# Patient Record
Sex: FEMALE | Race: WHITE | NOT HISPANIC OR LATINO | Employment: UNEMPLOYED | URBAN - METROPOLITAN AREA
[De-identification: names, ages, dates, MRNs, and addresses within clinical notes are randomized per-mention and may not be internally consistent; named-entity substitution may affect disease eponyms.]

---

## 2021-03-15 ENCOUNTER — HOSPITAL ENCOUNTER (EMERGENCY)
Facility: HOSPITAL | Age: 26
Discharge: HOME | End: 2021-03-15
Attending: EMERGENCY MEDICINE | Admitting: EMERGENCY MEDICINE
Payer: COMMERCIAL

## 2021-03-15 VITALS
HEART RATE: 89 BPM | TEMPERATURE: 97.2 F | SYSTOLIC BLOOD PRESSURE: 119 MMHG | WEIGHT: 100 LBS | BODY MASS INDEX: 21.57 KG/M2 | DIASTOLIC BLOOD PRESSURE: 61 MMHG | RESPIRATION RATE: 18 BRPM | HEIGHT: 57 IN | OXYGEN SATURATION: 98 %

## 2021-03-15 DIAGNOSIS — M79.604 RIGHT LEG PAIN: Primary | ICD-10-CM

## 2021-03-15 LAB — D DIMER PPP FEU-MCNC: <0.27 UG/ML FEU

## 2021-03-15 PROCEDURE — 99284 EMERGENCY DEPT VISIT MOD MDM: CPT | Performed by: EMERGENCY MEDICINE

## 2021-03-15 PROCEDURE — 85379 FIBRIN DEGRADATION QUANT: CPT | Performed by: EMERGENCY MEDICINE

## 2021-03-15 PROCEDURE — 36415 COLL VENOUS BLD VENIPUNCTURE: CPT | Performed by: EMERGENCY MEDICINE

## 2021-03-15 PROCEDURE — 99284 EMERGENCY DEPT VISIT MOD MDM: CPT

## 2021-03-15 NOTE — ED PROVIDER NOTES
History  Chief Complaint   Patient presents with    Leg Pain     cysts from dog bite several years ago in right leg  leg feels asleep, painful to walk  HPI      This is a very pleasant, nontoxic 40-year-old female who suffered a dog bite to the right leg in 2018 today presents with R leg pain  Patient was driving her car where she reports that she had her leg fall asleep, upon further questioning patient describes this as pins and needles in her right leg  Patient denies any shortness of breath  Patient denies any history of pulmonary embolism  Patient also denies any recent travel outside the geographical area  Review of Epic noted pt had an US of R upper thigh area (Jan 12, 2021) and was found to have 2 sets of cm cyst within the superficial subcutaneous tissue the right upper thigh located in the medial upper thigh, one measured 5 mm and the other measured 3 mm  No DVT, patient messaged her [facility] primary care provider today and was referred to emergency department for further evaluation  Patient reports that this cyst in her right leg has been bothering her for a few months  Prior to Admission Medications   Prescriptions Last Dose Informant Patient Reported? Taking? [facility]   Yes Yes   Sig: Insert 1 capsule into the vagina      Facility-Administered Medications: None       History reviewed  No pertinent past medical history  Past Surgical History:   Procedure Laterality Date    WISDOM TOOTH EXTRACTION         History reviewed  No pertinent family history  I have reviewed and agree with the history as documented  E-Cigarette/Vaping     E-Cigarette/Vaping Substances     Social History     Tobacco Use    Smoking status: Current Every Day Smoker     Packs/day: 0 50     Types: Cigarettes    Smokeless tobacco: Never Used   Substance Use Topics    Alcohol use: Not Currently    Drug use: Not Currently       Review of Systems   Constitutional: Negative      HENT: Negative  Eyes: Negative  Respiratory: Negative  Cardiovascular: Negative  Gastrointestinal: Negative  Endocrine: Negative  Genitourinary: Negative  Musculoskeletal:        R thigh pain  Skin: Negative  Allergic/Immunologic: Negative  Neurological: Negative  Hematological: Negative  Psychiatric/Behavioral: Negative  Physical Exam  Physical Exam  Vitals signs and nursing note reviewed  Exam conducted with a chaperone present  Constitutional:       Appearance: Normal appearance  She is normal weight  HENT:      Head: Atraumatic  Right Ear: External ear normal       Mouth/Throat:      Mouth: Mucous membranes are moist       Pharynx: Oropharynx is clear  Eyes:      Extraocular Movements: Extraocular movements intact  Conjunctiva/sclera: Conjunctivae normal       Pupils: Pupils are equal, round, and reactive to light  Cardiovascular:      Rate and Rhythm: Normal rate  Pulses: Normal pulses  Pulmonary:      Effort: Pulmonary effort is normal    Musculoskeletal:         General: No swelling, tenderness, deformity or signs of injury  Skin:     Capillary Refill: Capillary refill takes less than 2 seconds  Neurological:      General: No focal deficit present  Mental Status: She is alert and oriented to person, place, and time  Mental status is at baseline     Psychiatric:         Mood and Affect: Mood normal          Behavior: Behavior normal          Vital Signs  ED Triage Vitals   Temperature Pulse Respirations Blood Pressure SpO2   03/15/21 1910 03/15/21 1653 03/15/21 1653 03/15/21 1653 03/15/21 1653   (!) 97 2 °F (36 2 °C) 92 16 126/61 99 %      Temp Source Heart Rate Source Patient Position - Orthostatic VS BP Location FiO2 (%)   03/15/21 1910 03/15/21 1653 03/15/21 1653 03/15/21 1653 --   Tympanic Monitor Sitting Left arm       Pain Score       03/15/21 1653       Worst Possible Pain           Vitals:    03/15/21 1653 03/15/21 1910   BP: 126/61 119/61   Pulse: 92 89   Patient Position - Orthostatic VS: Sitting Lying         Visual Acuity      ED Medications  Medications - No data to display    Diagnostic Studies  Results Reviewed     Procedure Component Value Units Date/Time    D-Dimer [[id number]]  (Normal) Collected: 03/15/21 1818    Lab Status: Final result Specimen: Blood from Arm, Left Updated: 03/15/21 1852     D-Dimer, Oscar Rodney <0 27 ug/ml FEU                  VAS lower limb venous duplex study, unilateral/limited    (Results Pending)              Procedures  Procedures         ED Course  ED Course as of Mar 15 1928   Mon Mar 15, 2021   1752 With female chaperone present, patient was examined did not appreciate a obvious cyst or lump along the left femoral / thigh area, pt has has a documented hx of a sebaceous cyst in the area, patient has some mild pain along the medial femoral thigh but no palpable abnormality no palpable lymphadenopathy  Will proceed with a D-dimer if negative patient could have an all nonemergent ultrasound performed as an outpatient with follow-up with her primary care doctor  1901 D-dimer negative d/c to home with Doppler Rx to follow up                                        Herber Birks' Criteria for DVT      Most Recent Value   Wells' Criteria for DVT   Active cancer Treatment or palliation within 6 months  0 Filed at: 03/15/2021 Wyonia Balls recently >3 days or major surgery within 12 weeks  0 Filed at: 03/15/2021 1926   Calf swelling >3 cm compared to the other leg  0 Filed at: 03/15/2021 1926   Entire leg swollen  0 Filed at: 03/15/2021 1926   Collateral (nonvaricose) superficial veins present  0 Filed at: 03/15/2021 1926   Localized tenderness along the deep venous system  1 Filed at: 03/15/2021 1926   Pitting edema, confined to symptomatic leg  0 Filed at: 03/15/2021 1926   Paralysis, paresis, or recent plaster immobilization of the lower extremity  0 Filed at: 03/15/2021 1926   Previously documented DVT  0 Filed at: 03/15/2021 1926   Alternative diagnosis to DVT as likely or more likely  0 Filed at: 03/15/2021 1926   Wells DVT Critera Score  1 Filed at: 03/15/2021 1926              [facility]  Number of Diagnoses or Management Options  Right leg pain:   Diagnosis management comments: Please see ED course for specifics, D-dimer negative, low risk Wells criteria for DVT consideration, patient has a history of having a cyst on this right leg in the past 2018  Ambulatory DVT studies ordered for the patient have results sent to her primary care doctor [facility]  No shortness of breath  Patient had no focal back pain had equal bilateral femoral pulses  The patient's presentation of leg going numb" described as pins and needles unclear, no MS hx, neurologically intact during ED visit, motor and sensation intact  Portions of the record may have been created with voice recognition software  Occasional wrong word or "sound a like" substitutions may have occurred due to the inherent limitations of voice recognition software  Read the chart carefully and recognize, using context, where substitutions have occurred  Counseling: I had a detailed discussion with the patient and/or guardian regarding: the historical points, exam findings, and any diagnostic results supporting the discharge diagnosis, lab results, radiology results, discharge instructions reviewed with patient and/or family/caregiver and understanding was verbalized   Instructions given to return to the emergency department if symptoms worsen or persist, or if there are any questions or concerns that arise at home         Amount and/or Complexity of Data Reviewed  Clinical lab tests: ordered and reviewed  Tests in the medicine section of CPT®: ordered and reviewed        Disposition  Final diagnoses:   Right leg pain     Time reflects when diagnosis was documented in both MDM as applicable and the Disposition within this note     Time User Action Codes Description Comment 3/15/2021  7:02 PM Pepe Sevilla Add [X20 806] Right leg pain       ED Disposition     ED Disposition Condition Date/Time Comment    Discharge Stable Mon Mar 15, 2021  5:52 PM Niecy Hopson discharge to home/self care  Follow-up Information     Follow up With Specialties Details Why Adalberto Sevilla MD [facility]   [address]  [place] [address]  [phone]            Discharge Medication List as of 3/15/2021  7:07 PM      CONTINUE these medications which have NOT CHANGED    Details   [facility] Insert 1 capsule into the vagina, Historical Med           Outpatient Discharge Orders   VAS lower limb venous duplex study, unilateral/limited   Standing Status: Future Standing Exp   Date: 03/15/25       PDMP Review     None          ED Provider  Electronically Signed by           Sergio Middleton III, DO  03/15/21 1928

## 2021-12-04 ENCOUNTER — HOSPITAL ENCOUNTER (EMERGENCY)
Facility: HOSPITAL | Age: 26
Discharge: HOME | End: 2021-12-04
Attending: EMERGENCY MEDICINE
Payer: COMMERCIAL

## 2021-12-04 VITALS
WEIGHT: 100 LBS | DIASTOLIC BLOOD PRESSURE: 64 MMHG | HEIGHT: 57 IN | BODY MASS INDEX: 21.57 KG/M2 | TEMPERATURE: 100.4 F | SYSTOLIC BLOOD PRESSURE: 122 MMHG | RESPIRATION RATE: 18 BRPM | OXYGEN SATURATION: 97 % | HEART RATE: 110 BPM

## 2021-12-04 DIAGNOSIS — U07.1 COVID-19: Primary | ICD-10-CM

## 2021-12-04 LAB
FLUAV RNA RESP QL NAA+PROBE: NEGATIVE
FLUBV RNA RESP QL NAA+PROBE: NEGATIVE
RSV RNA RESP QL NAA+PROBE: NEGATIVE
SARS-COV-2 RNA RESP QL NAA+PROBE: POSITIVE

## 2021-12-04 PROCEDURE — 96372 THER/PROPH/DIAG INJ SC/IM: CPT

## 2021-12-04 PROCEDURE — 99283 EMERGENCY DEPT VISIT LOW MDM: CPT

## 2021-12-04 PROCEDURE — 99284 EMERGENCY DEPT VISIT MOD MDM: CPT | Performed by: EMERGENCY MEDICINE

## 2021-12-04 PROCEDURE — 0241U HB NFCT DS VIR RESP RNA 4 TRGT: CPT | Performed by: EMERGENCY MEDICINE

## 2021-12-04 RX ORDER — ACETAMINOPHEN 325 MG/1
975 TABLET ORAL ONCE
Status: COMPLETED | OUTPATIENT
Start: 2021-12-04 | End: 2021-12-04

## 2021-12-04 RX ORDER — HYDROXYZINE HYDROCHLORIDE 10 MG/1
TABLET, FILM COATED ORAL
COMMUNITY
Start: 2021-07-20 | End: 2022-02-10

## 2021-12-04 RX ORDER — ACETAMINOPHEN 500 MG
1000 TABLET ORAL EVERY 8 HOURS PRN
Qty: 30 TABLET | Refills: 0 | Status: SHIPPED | OUTPATIENT
Start: 2021-12-04

## 2021-12-04 RX ORDER — IBUPROFEN 800 MG/1
800 TABLET ORAL 3 TIMES DAILY
Qty: 21 TABLET | Refills: 0 | Status: SHIPPED | OUTPATIENT
Start: 2021-12-04

## 2021-12-04 RX ORDER — NORETHINDRONE ACETATE AND ETHINYL ESTRADIOL 1.5-30(21)
1 KIT ORAL DAILY
COMMUNITY
Start: 2021-10-13 | End: 2022-10-13

## 2021-12-04 RX ORDER — KETOROLAC TROMETHAMINE 30 MG/ML
30 INJECTION, SOLUTION INTRAMUSCULAR; INTRAVENOUS ONCE
Status: COMPLETED | OUTPATIENT
Start: 2021-12-04 | End: 2021-12-04

## 2021-12-04 RX ORDER — FERROUS SULFATE 325(65) MG
TABLET ORAL
COMMUNITY
Start: 2021-07-20

## 2021-12-04 RX ADMIN — ACETAMINOPHEN 975 MG: 325 TABLET ORAL at 04:42

## 2021-12-04 RX ADMIN — KETOROLAC TROMETHAMINE 30 MG: 30 INJECTION, SOLUTION INTRAMUSCULAR at 04:41

## 2022-02-10 ENCOUNTER — HOSPITAL ENCOUNTER (EMERGENCY)
Facility: HOSPITAL | Age: 27
Discharge: HOME | End: 2022-02-10
Attending: EMERGENCY MEDICINE | Admitting: EMERGENCY MEDICINE
Payer: COMMERCIAL

## 2022-02-10 VITALS
RESPIRATION RATE: 18 BRPM | OXYGEN SATURATION: 99 % | TEMPERATURE: 97.8 F | WEIGHT: 100 LBS | SYSTOLIC BLOOD PRESSURE: 97 MMHG | HEART RATE: 77 BPM | BODY MASS INDEX: 21.64 KG/M2 | DIASTOLIC BLOOD PRESSURE: 72 MMHG

## 2022-02-10 DIAGNOSIS — K04.7 DENTAL INFECTION: ICD-10-CM

## 2022-02-10 DIAGNOSIS — L50.9 HIVES: Primary | ICD-10-CM

## 2022-02-10 DIAGNOSIS — K20.90 ESOPHAGITIS: ICD-10-CM

## 2022-02-10 PROCEDURE — 99283 EMERGENCY DEPT VISIT LOW MDM: CPT

## 2022-02-10 PROCEDURE — 99284 EMERGENCY DEPT VISIT MOD MDM: CPT | Performed by: EMERGENCY MEDICINE

## 2022-02-10 PROCEDURE — 96372 THER/PROPH/DIAG INJ SC/IM: CPT

## 2022-02-10 RX ORDER — METHYLPREDNISOLONE SODIUM SUCCINATE 125 MG/2ML
60 INJECTION, POWDER, LYOPHILIZED, FOR SOLUTION INTRAMUSCULAR; INTRAVENOUS ONCE
Status: COMPLETED | OUTPATIENT
Start: 2022-02-10 | End: 2022-02-10

## 2022-02-10 RX ORDER — METHYLPREDNISOLONE 4 MG/1
TABLET ORAL
Qty: 21 TABLET | Refills: 0 | Status: SHIPPED | OUTPATIENT
Start: 2022-02-10

## 2022-02-10 RX ORDER — PENICILLIN V POTASSIUM 500 MG/1
500 TABLET ORAL 4 TIMES DAILY
Qty: 28 TABLET | Refills: 0 | Status: SHIPPED | OUTPATIENT
Start: 2022-02-10 | End: 2022-02-17

## 2022-02-10 RX ORDER — DIPHENHYDRAMINE HCL 25 MG
25-50 TABLET ORAL EVERY 6 HOURS
Qty: 30 TABLET | Refills: 0 | Status: SHIPPED | OUTPATIENT
Start: 2022-02-10

## 2022-02-10 RX ORDER — OMEPRAZOLE 20 MG/1
20-40 CAPSULE, DELAYED RELEASE ORAL DAILY PRN
Qty: 20 CAPSULE | Refills: 0 | Status: SHIPPED | OUTPATIENT
Start: 2022-02-10

## 2022-02-10 RX ORDER — FAMOTIDINE 20 MG/1
20 TABLET, FILM COATED ORAL ONCE
Status: COMPLETED | OUTPATIENT
Start: 2022-02-10 | End: 2022-02-10

## 2022-02-10 RX ORDER — DIPHENHYDRAMINE HCL 25 MG
50 TABLET ORAL ONCE
Status: COMPLETED | OUTPATIENT
Start: 2022-02-10 | End: 2022-02-10

## 2022-02-10 RX ADMIN — METHYLPREDNISOLONE SODIUM SUCCINATE 60 MG: 125 INJECTION, POWDER, FOR SOLUTION INTRAMUSCULAR; INTRAVENOUS at 03:31

## 2022-02-10 RX ADMIN — FAMOTIDINE 20 MG: 20 TABLET, FILM COATED ORAL at 03:30

## 2022-02-10 RX ADMIN — DIPHENHYDRAMINE HCL 50 MG: 25 TABLET ORAL at 03:30

## 2022-02-10 NOTE — ED PROVIDER NOTES
History  Chief Complaint   Patient presents with    Allergic Reaction       28 YO Female    PMH:  None  Soc: 1 ppd smoker  Meds: none  Allergies: none      Pt brought for evaluation of hives  Started Clindamycin 3 days ago for dental infection, she is certain this is the cause of her rash   Pt has rash over arms, abdomen and back    Pt has no wheezing or sob, no chest tightness    She was having pain in her esophagus yesterday  Symptoms resolved since      Pt is comfortable now      No Fever/chills    No facial swelling        No headache  No neck stiffness     No abdominal pain      No other complaints                History provided by:  Patient  Allergic Reaction  Presenting symptoms: rash    Presenting symptoms: no wheezing    Severity:  Moderate  Context: medications    Relieved by:  Nothing  Worsened by:  Nothing  Ineffective treatments:  None tried      Prior to Admission Medications   Prescriptions Last Dose Informant Patient Reported? Taking? [facility]   Yes No   Sig: Insert 1 capsule into the vagina   acetaminophen (TYLENOL) 500 mg tablet   No No   Sig: Take 2 tablets (1,000 mg total) by mouth every 8 (eight) hours as needed for mild pain   ferrous sulfate 325 (65 Fe) mg tablet   Yes No   Sig: Take 1 tab every other day with vit C   ibuprofen (MOTRIN) 800 mg tablet   No No   Sig: Take 1 tablet (800 mg total) by mouth 3 (three) times a day   norethindrone-ethinyl estradiol-iron (MICROGESTIN FE1 5/30) 1 5-30 MG-MCG tablet   Yes No   Sig: Take 1 tablet by mouth daily      Facility-Administered Medications: None       Past Medical History:   Diagnosis Date    Bronchitis        Past Surgical History:   Procedure Laterality Date    WISDOM TOOTH EXTRACTION         History reviewed  No pertinent family history  I have reviewed and agree with the history as documented      E-Cigarette/Vaping    E-Cigarette Use Never User      E-Cigarette/Vaping Substances     Social History     Tobacco Use    Smoking status: Current Every Day Smoker     Packs/day: 1 00     Types: Cigarettes    Smokeless tobacco: Never Used   Vaping Use    Vaping Use: Never used   Substance Use Topics    Alcohol use: Not Currently    Drug use: Not Currently       Review of Systems   Constitutional: Negative for chills, diaphoresis, fatigue and fever  Respiratory: Negative for cough, shortness of breath, wheezing and stridor  Cardiovascular: Negative for chest pain, palpitations and leg swelling  Gastrointestinal: Negative for abdominal pain, blood in stool, nausea and vomiting  Genitourinary: Negative for difficulty urinating, dysuria, flank pain and frequency  Musculoskeletal: Negative for arthralgias, back pain, gait problem, joint swelling, myalgias, neck pain and neck stiffness  Skin: Positive for rash  Negative for wound  Neurological: Negative for dizziness, light-headedness and headaches  All other systems reviewed and are negative  Physical Exam  Physical Exam  Constitutional:       General: She is not in acute distress  Appearance: She is well-developed  She is not ill-appearing, toxic-appearing or diaphoretic  HENT:      Head: Normocephalic and atraumatic  Right Ear: External ear normal       Left Ear: External ear normal       Nose: Nose normal  No congestion or rhinorrhea  Mouth/Throat:      Pharynx: No oropharyngeal exudate or posterior oropharyngeal erythema  Eyes:      General: No scleral icterus  Right eye: No discharge  Left eye: No discharge  Extraocular Movements: Extraocular movements intact  Conjunctiva/sclera: Conjunctivae normal       Pupils: Pupils are equal, round, and reactive to light  Neck:      Vascular: No JVD  Trachea: No tracheal deviation  Cardiovascular:      Rate and Rhythm: Normal rate and regular rhythm  Pulses: Normal pulses  Heart sounds: Normal heart sounds  No murmur heard  No friction rub  No gallop  Pulmonary:      Effort: Pulmonary effort is normal  No respiratory distress  Breath sounds: Normal breath sounds  No stridor  No wheezing, rhonchi or rales  Chest:      Chest wall: No tenderness  Abdominal:      General: Bowel sounds are normal  There is no distension  Palpations: Abdomen is soft  There is no mass  Tenderness: There is no abdominal tenderness  There is no right CVA tenderness, left CVA tenderness, guarding or rebound  Hernia: No hernia is present  Musculoskeletal:         General: No swelling, tenderness, deformity or signs of injury  Normal range of motion  Cervical back: Normal range of motion and neck supple  No rigidity or tenderness  Right lower leg: No edema  Left lower leg: No edema  Lymphadenopathy:      Cervical: No cervical adenopathy  Skin:     General: Skin is warm  Capillary Refill: Capillary refill takes less than 2 seconds  Coloration: Skin is not jaundiced or pale  Findings: Rash (URTICARIA, MODERATE) present  No bruising, erythema or lesion  Neurological:      General: No focal deficit present  Mental Status: She is alert and oriented to person, place, and time  Mental status is at baseline  Cranial Nerves: No cranial nerve deficit  Sensory: No sensory deficit  Motor: No weakness or abnormal muscle tone  Coordination: Coordination normal    Psychiatric:         Mood and Affect: Mood normal          Behavior: Behavior normal          Thought Content:  Thought content normal          Judgment: Judgment normal          Vital Signs  ED Triage Vitals [02/10/22 0253]   Temperature Pulse Respirations Blood Pressure SpO2   97 8 °F (36 6 °C) 77 18 97/72 99 %      Temp Source Heart Rate Source Patient Position - Orthostatic VS BP Location FiO2 (%)   Temporal Monitor Sitting Left arm --      Pain Score       --           Vitals:    02/10/22 0253   BP: 97/72   Pulse: 77   Patient Position - Orthostatic VS: Sitting         Visual Acuity      ED Medications  Medications   methylPREDNISolone sodium succinate (Solu-MEDROL) injection 60 mg (60 mg Intramuscular Given 2/10/22 0331)   diphenhydrAMINE (BENADRYL) tablet 50 mg (50 mg Oral Given 2/10/22 0330)   famotidine (PEPCID) tablet 20 mg (20 mg Oral Given 2/10/22 0330)       Diagnostic Studies  Results Reviewed     None                 No orders to display              Procedures  Procedures         ED Course  ED Course as of 02/10/22 0517   Thu Feb 10, 2022   0435 Pt's rash is 90% improved  Pt feels very well and is requesting discharge  She is driving and I offered to keep her here if she felt she couldn't drive, but she assures me she is just fine                                SBIRT 20yo+      Most Recent Value   SBIRT (25 yo +)    In order to provide better care to our patients, we are screening all of our patients for alcohol and drug use  Would it be okay to ask you these screening questions? Yes Filed at: 02/10/2022 1631   Initial Alcohol Screen: US AUDIT-C     1  How often do you have a drink containing alcohol? 0 Filed at: 02/10/2022 0325   2  How many drinks containing alcohol do you have on a typical day you are drinking? 0 Filed at: 02/10/2022 0325   3b  FEMALE Any Age, or MALE 65+: How often do you have 4 or more drinks on one occassion? 0 Filed at: 02/10/2022 0325   Audit-C Score 0 Filed at: 02/10/2022 4042   GA: How many times in the past year have you    Used an illegal drug or used a prescription medication for non-medical reasons?  Never Filed at: 02/10/2022 5393                    MDM    Disposition  Final diagnoses:   Hives   Esophagitis   Dental infection     Time reflects when diagnosis was documented in both MDM as applicable and the Disposition within this note     Time User Action Codes Description Comment    2/10/2022  4:35 AM Vicky Valenzuela Add [L50 9] Hives     2/10/2022  4:38 AM Looscarha Nicolas Add [K20 90] Esophagitis     2/10/2022  4:40 SAMEER Colby Add [K04 7] Dental infection       ED Disposition     ED Disposition Condition Date/Time Comment    Discharge Stable Thu Feb 10, 2022  4:35 AM Vidya Rivera discharge to home/self care  Follow-up Information     Follow up With Specialties Details Why Kay Casillas MD [facility] Medicine Call   [place] [address] [facility]      [address] [place] [address]  Call today            Discharge Medication List as of 2/10/2022  4:47 AM      START taking these medications    Details   diphenhydrAMINE (BENADRYL) 25 mg tablet Take 1-2 tablets (25-50 mg total) by mouth every 6 (six) hours, Starting Thu 2/10/2022, Normal      methylPREDNISolone 4 MG tablet therapy pack Use as directed on package, Normal      omeprazole (PriLOSEC) 20 mg delayed release capsule Take 1-2 capsules (20-40 mg total) by mouth daily as needed (ESOPHOGITIS SYMPTOMS), Starting Thu 2/10/2022, Normal      penicillin V potassium (VEETID) 500 mg tablet Take 1 tablet (500 mg total) by mouth 4 (four) times a day for 7 days, Starting Thu 2/10/2022, Until Thu 2/17/2022, Normal         CONTINUE these medications which have NOT CHANGED    Details   acetaminophen (TYLENOL) 500 mg tablet Take 2 tablets (1,000 mg total) by mouth every 8 (eight) hours as needed for mild pain, Starting Sat 12/4/2021, Print      ferrous sulfate 325 (65 Fe) mg tablet Take 1 tab every other day with vit C, Historical Med      [facility] Insert 1 capsule into the vagina, Historical Med      ibuprofen (MOTRIN) 800 mg tablet Take 1 tablet (800 mg total) by mouth 3 (three) times a day, Starting Sat 12/4/2021, Print      norethindrone-ethinyl estradiol-iron (MICROGESTIN FE1 5/30) 1 5-30 MG-MCG tablet Take 1 tablet by mouth daily, Starting Wed 10/13/2021, Until Thu 10/13/2022, Historical Med             No discharge procedures on file      PDMP Review     None          ED Provider  Electronically Signed by Brooks Muhammad MD  02/10/22 6519

## 2022-02-10 NOTE — DISCHARGE INSTRUCTIONS
RETURN IF WORSE IN ANY WAY:     CHEST PAIN,   SHORTNESS OF BREATH,   PAIN,   FEVER OR FLU LIKE SYMPTOMS,   OR NEW AND CONCERNING SYMPTOMS SIGNS OR SYMPTOMS:      PLEASE CALL YOUR PRIMARY DOCTOR IN THE MORNING TO SET UP FOLLOW UP   PLEASE REVIEW THE WORK UP RESULTS WITH YOUR DOCTOR      EVEN IF MILD SYMPTOMS RETURN:   Start Benadryl as needed and as directed  Take Pepcid 20mg daily for 7 days   Start Medrol Dose pack (prescribed)     IF YOUR ESOPHAGITIS SYMPTOMS START AGAIN:   PLEASE START OMEPRAZOLE DAILY (YOU CAN TAKE 40mg, TWO TABS, IF ONE TABLET IS NOT SUFFICIENT)      PLEASE STOP THE CLINDAMYCIN  START PENICILLIN

## 2022-11-08 ENCOUNTER — APPOINTMENT (OUTPATIENT)
Dept: LAB | Facility: CLINIC | Age: 27
End: 2022-11-08

## 2022-11-08 ENCOUNTER — OFFICE VISIT (OUTPATIENT)
Dept: FAMILY MEDICINE CLINIC | Facility: CLINIC | Age: 27
End: 2022-11-08

## 2022-11-08 VITALS
WEIGHT: 94.2 LBS | OXYGEN SATURATION: 98 % | BODY MASS INDEX: 20.32 KG/M2 | DIASTOLIC BLOOD PRESSURE: 68 MMHG | RESPIRATION RATE: 16 BRPM | TEMPERATURE: 98.5 F | SYSTOLIC BLOOD PRESSURE: 110 MMHG | HEIGHT: 57 IN | HEART RATE: 90 BPM

## 2022-11-08 DIAGNOSIS — E61.1 IRON DEFICIENCY: ICD-10-CM

## 2022-11-08 DIAGNOSIS — Z87.59 HISTORY OF MISCARRIAGE: ICD-10-CM

## 2022-11-08 DIAGNOSIS — Z13.220 ENCOUNTER FOR SCREENING FOR LIPID DISORDER: ICD-10-CM

## 2022-11-08 DIAGNOSIS — N97.9 FEMALE FERTILITY PROBLEM: ICD-10-CM

## 2022-11-08 DIAGNOSIS — Z72.0 TOBACCO USE: ICD-10-CM

## 2022-11-08 DIAGNOSIS — Z12.4 CERVICAL CANCER SCREENING: ICD-10-CM

## 2022-11-08 DIAGNOSIS — D22.9 NEVUS: ICD-10-CM

## 2022-11-08 DIAGNOSIS — Z87.09 HISTORY OF ASTHMA: ICD-10-CM

## 2022-11-08 DIAGNOSIS — N97.9 FEMALE FERTILITY PROBLEM: Primary | ICD-10-CM

## 2022-11-08 DIAGNOSIS — Z76.89 ENCOUNTER TO ESTABLISH CARE: ICD-10-CM

## 2022-11-08 PROBLEM — Z80.41 FH: OVARIAN CANCER IN FIRST DEGREE RELATIVE: Status: ACTIVE | Noted: 2018-03-15

## 2022-11-08 PROBLEM — Z86.69 HISTORY OF MIGRAINE: Status: ACTIVE | Noted: 2022-11-08

## 2022-11-08 LAB
ALBUMIN SERPL BCP-MCNC: 4 G/DL (ref 3.5–5)
ALP SERPL-CCNC: 59 U/L (ref 46–116)
ALT SERPL W P-5'-P-CCNC: 17 U/L (ref 12–78)
ANION GAP SERPL CALCULATED.3IONS-SCNC: 4 MMOL/L (ref 4–13)
AST SERPL W P-5'-P-CCNC: 13 U/L (ref 5–45)
BASOPHILS # BLD AUTO: 0.09 THOUSANDS/ÂΜL (ref 0–0.1)
BASOPHILS NFR BLD AUTO: 1 % (ref 0–1)
BILIRUB SERPL-MCNC: 0.27 MG/DL (ref 0.2–1)
BUN SERPL-MCNC: 13 MG/DL (ref 5–25)
CALCIUM SERPL-MCNC: 9.8 MG/DL (ref 8.3–10.1)
CHLORIDE SERPL-SCNC: 106 MMOL/L (ref 96–108)
CHOLEST SERPL-MCNC: 162 MG/DL
CO2 SERPL-SCNC: 27 MMOL/L (ref 21–32)
CREAT SERPL-MCNC: 0.86 MG/DL (ref 0.6–1.3)
EOSINOPHIL # BLD AUTO: 0.48 THOUSAND/ÂΜL (ref 0–0.61)
EOSINOPHIL NFR BLD AUTO: 5 % (ref 0–6)
ERYTHROCYTE [DISTWIDTH] IN BLOOD BY AUTOMATED COUNT: 12.8 % (ref 11.6–15.1)
FERRITIN SERPL-MCNC: 14 NG/ML (ref 8–388)
GFR SERPL CREATININE-BSD FRML MDRD: 91 ML/MIN/1.73SQ M
GLUCOSE P FAST SERPL-MCNC: 88 MG/DL (ref 65–99)
HCT VFR BLD AUTO: 42.4 % (ref 34.8–46.1)
HDLC SERPL-MCNC: 54 MG/DL
HGB BLD-MCNC: 13.5 G/DL (ref 11.5–15.4)
IMM GRANULOCYTES # BLD AUTO: 0.04 THOUSAND/UL (ref 0–0.2)
IMM GRANULOCYTES NFR BLD AUTO: 0 % (ref 0–2)
IRON SATN MFR SERPL: 17 % (ref 15–50)
IRON SERPL-MCNC: 56 UG/DL (ref 50–170)
LDLC SERPL CALC-MCNC: 94 MG/DL (ref 0–100)
LYMPHOCYTES # BLD AUTO: 2.71 THOUSANDS/ÂΜL (ref 0.6–4.47)
LYMPHOCYTES NFR BLD AUTO: 28 % (ref 14–44)
MCH RBC QN AUTO: 31.6 PG (ref 26.8–34.3)
MCHC RBC AUTO-ENTMCNC: 31.8 G/DL (ref 31.4–37.4)
MCV RBC AUTO: 99 FL (ref 82–98)
MONOCYTES # BLD AUTO: 0.9 THOUSAND/ÂΜL (ref 0.17–1.22)
MONOCYTES NFR BLD AUTO: 9 % (ref 4–12)
NEUTROPHILS # BLD AUTO: 5.56 THOUSANDS/ÂΜL (ref 1.85–7.62)
NEUTS SEG NFR BLD AUTO: 57 % (ref 43–75)
NRBC BLD AUTO-RTO: 0 /100 WBCS
PLATELET # BLD AUTO: 281 THOUSANDS/UL (ref 149–390)
PMV BLD AUTO: 11.4 FL (ref 8.9–12.7)
POTASSIUM SERPL-SCNC: 4.3 MMOL/L (ref 3.5–5.3)
PROLACTIN SERPL-MCNC: 8.4 NG/ML
PROT SERPL-MCNC: 7.4 G/DL (ref 6.4–8.4)
RBC # BLD AUTO: 4.27 MILLION/UL (ref 3.81–5.12)
SODIUM SERPL-SCNC: 137 MMOL/L (ref 135–147)
TIBC SERPL-MCNC: 329 UG/DL (ref 250–450)
TRIGL SERPL-MCNC: 68 MG/DL
TSH SERPL DL<=0.05 MIU/L-ACNC: 1.11 UIU/ML (ref 0.45–4.5)
WBC # BLD AUTO: 9.78 THOUSAND/UL (ref 4.31–10.16)

## 2022-11-08 RX ORDER — NICOTINE 21 MG/24HR
1 PATCH, TRANSDERMAL 24 HOURS TRANSDERMAL EVERY 24 HOURS
Qty: 28 PATCH | Refills: 3 | Status: SHIPPED | OUTPATIENT
Start: 2022-11-08

## 2022-11-08 NOTE — PROGRESS NOTES
Rojelio Gallardo 1995 female MRN: [id number]  Connor Witt 12    Visit to Establish Care: Family Medicine    Assessment/Plan     No problem-specific Assessment & Plan notes found for this encounter  Kyle Castañeda was seen today for establish care  Diagnoses and all orders for this visit:    Female fertility problem  -     TSH, 3rd generation with Free T4 reflex; Future  -     Prolactin; Future  -     Cardiolipin antibody; Future    History of miscarriage  -     Cardiolipin antibody; Future    Iron deficiency  -     CBC and differential; Future  -     Comprehensive metabolic panel; Future  -     Iron Panel (Includes Ferritin, Iron Sat%, Iron, and TIBC); Future    History of asthma    Tobacco use  -     nicotine (NICODERM CQ) 21 mg/24 hr TD 24 hr patch; Place 1 patch on the skin every 24 hours    Nevus  -     Ambulatory Referral to Dermatology; Future    Encounter for screening for lipid disorder  -     Lipid Panel with Direct LDL reflex; Future    Cervical cancer screening  -     Ambulatory Referral to Obstetrics / Gynecology; Future    Encounter to establish care        In addition to the above, the patient was counseled on general preventative health care subjects, including but not limited to:  - Nutrition, healthy weight, aerobic and weight-bearing exercise  - Mental health, social support, and self care  - Advised of the importance of dental hygiene and routine dental visits   - Patient made aware of  services at the office  SUBJECTIVE    HPI:  Rojelio Gallardo is a 32 y o  female who presented to establish care with this family medicine practice  She reports history of recurrent bronchitis, history of asthma as child, reports normal PFT a couple years ago  Breathing okay recently, no concerns  Tobacco use- Smokes a pack of cigarettes daily, tried gum, vaping, nothing works  Smoking since age 14  Fertility concern- Miscarriage history   History of dog bite around one, antibiotics and then nerve damage from that  2016 miscarriage, 1st trimester  Feb 2022 4-6 weeks along second miscarriage  6 years trying  Partner with no children  History of heavy bleeding, clots  Periods shorter now, regular  LMP 10/24  Mole back of neck couple years, but bothersome now, catches on clothing/hair, bleeds  Would like it removed  Mother history of ovarian cancer  She reports history of migraines, as well as difficulty seeing at night  Reports she was told possible glaucoma, never got it checked  Has upcoming appointment eye doctor  Review of Systems   All other systems reviewed and are negative  Historical Information   Past Medical History:   Diagnosis Date   • Bronchitis      Past Surgical History:   Procedure Laterality Date   • WISDOM TOOTH EXTRACTION       No family history on file  Social History     Socioeconomic History   • Marital status: Single     Spouse name: Not on file   • Number of children: Not on file   • Years of education: Not on file   • Highest education level: Not on file   Occupational History   • Not on file   Tobacco Use   • Smoking status: Current Every Day Smoker     Packs/day: 1 00     Types: Cigarettes   • Smokeless tobacco: Never Used   Vaping Use   • Vaping Use: Never used   Substance and Sexual Activity   • Alcohol use: Not Currently   • Drug use: Not Currently   • Sexual activity: Not on file   Other Topics Concern   • Not on file   Social History Narrative   • Not on file     Social Determinants of Health     Financial Resource Strain: Not on file   Food Insecurity: Not on file   Transportation Needs: Not on file   Physical Activity: Not on file   Stress: Not on file   Social Connections: Not on file   Intimate Partner Violence: Not on file   Housing Stability: Not on file     OB History    No obstetric history on file             Medications:      Current Outpatient Medications:   •  acetaminophen (TYLENOL) 500 mg tablet, Take 2 tablets (1,000 mg total) by mouth every 8 (eight) hours as needed for mild pain, Disp: 30 tablet, Rfl: 0  •  diphenhydrAMINE (BENADRYL) 25 mg tablet, Take 1-2 tablets (25-50 mg total) by mouth every 6 (six) hours, Disp: 30 tablet, Rfl: 0  •  ferrous sulfate 325 (65 Fe) mg tablet, Take 1 tab every other day with vit C, Disp: , Rfl:   •  ibuprofen (MOTRIN) 800 mg tablet, Take 1 tablet (800 mg total) by mouth 3 (three) times a day, Disp: 21 tablet, Rfl: 0  •  nicotine (NICODERM CQ) 21 mg/24 hr TD 24 hr patch, Place 1 patch on the skin every 24 hours, Disp: 28 patch, Rfl: 3      Physical Exam:    Physical Exam  Vitals reviewed  Constitutional:       General: She is not in acute distress  Appearance: Normal appearance  She is not ill-appearing, toxic-appearing or diaphoretic  HENT:      Head: Normocephalic and atraumatic  Eyes:      General:         Right eye: No discharge  Left eye: No discharge  Extraocular Movements: Extraocular movements intact  Conjunctiva/sclera: Conjunctivae normal    Cardiovascular:      Rate and Rhythm: Normal rate and regular rhythm  Heart sounds: Normal heart sounds  No murmur heard  No friction rub  No gallop  Pulmonary:      Effort: Pulmonary effort is normal  No respiratory distress  Breath sounds: Normal breath sounds  No stridor  No wheezing or rhonchi  Musculoskeletal:         General: No swelling, tenderness or signs of injury  Right lower leg: No edema  Left lower leg: No edema  Skin:     General: Skin is warm  Coloration: Skin is not pale  Findings: No erythema or rash  Neurological:      Mental Status: She is alert and oriented to person, place, and time  Motor: No weakness     Psychiatric:         Mood and Affect: Mood normal          Behavior: Behavior normal             Future Appointments   Date Time Provider Flaquito Villalta   12/7/2022  8:00 AM DO [facility]  [facility] Kalani Jennings, [address] [facility]

## 2022-11-11 LAB
CARDIOLIPIN IGA SER IA-ACNC: 2.1
CARDIOLIPIN IGG SER IA-ACNC: 0.7
CARDIOLIPIN IGM SER IA-ACNC: 1.2

## 2022-11-28 ENCOUNTER — OFFICE VISIT (OUTPATIENT)
Dept: URGENT CARE | Facility: CLINIC | Age: 27
End: 2022-11-28

## 2022-11-28 VITALS
OXYGEN SATURATION: 98 % | SYSTOLIC BLOOD PRESSURE: 106 MMHG | WEIGHT: 94 LBS | BODY MASS INDEX: 20.28 KG/M2 | DIASTOLIC BLOOD PRESSURE: 58 MMHG | TEMPERATURE: 98.8 F | HEIGHT: 57 IN | RESPIRATION RATE: 18 BRPM | HEART RATE: 112 BPM

## 2022-11-28 DIAGNOSIS — K04.7 DENTAL INFECTION: Primary | ICD-10-CM

## 2022-11-28 RX ORDER — CHLORHEXIDINE GLUCONATE 0.12 MG/ML
15 RINSE ORAL 2 TIMES DAILY
Qty: 120 ML | Refills: 0 | Status: SHIPPED | OUTPATIENT
Start: 2022-11-28

## 2022-11-28 RX ORDER — AMOXICILLIN AND CLAVULANATE POTASSIUM 875; 125 MG/1; MG/1
1 TABLET, FILM COATED ORAL EVERY 12 HOURS SCHEDULED
Qty: 14 TABLET | Refills: 0 | Status: SHIPPED | OUTPATIENT
Start: 2022-11-28 | End: 2022-12-05

## 2022-11-28 NOTE — PROGRESS NOTES
[facility]        NAME: Ksenia Miramontes is a 32 y o  female  : 1995    MRN: [id number]  DATE: 2022  TIME: 10:57 AM      Assessment and Plan     Dental infection [K04 7]  1  Dental infection  amoxicillin-clavulanate (AUGMENTIN) 875-125 mg per tablet    chlorhexidine (PERIDEX) 0 12 % solution            Patient Instructions     Take antibiotic as prescribed  Recommend probiotic use while taking antibiotic  Use peridex as prescribed  Recommend follow-up with your dentist    Acetaminophen or ibuprofen for for pain  Follow-up with PCP in 3-5 days  Go to ER if symptoms worsen  Chief Complaint     Chief Complaint   Patient presents with   • Dental Pain     Left side top  Started yesterday  Headache  History of Present Illness     Patient is a 57-year-old female who presents with left-sided dental pain for 1 day  States she did break a tooth on pumpkin pie on Thanksgiving Reports associated headache  Reports fever and chills yesterday  Denies nausea, vomiting, or diarrhea  Denies chance of pregnancy  States she just got new insurance and has a dentist but needs to make an appointment  Review of Systems     Review of Systems   Constitutional: Negative for chills and fever  HENT: Positive for dental problem  Negative for facial swelling  Gastrointestinal: Negative for diarrhea, nausea and vomiting  Neurological: Positive for headaches  All other systems reviewed and are negative          Current Medications       Current Outpatient Medications:   •  acetaminophen (TYLENOL) 500 mg tablet, Take 2 tablets (1,000 mg total) by mouth every 8 (eight) hours as needed for mild pain, Disp: 30 tablet, Rfl: 0  •  amoxicillin-clavulanate (AUGMENTIN) 875-125 mg per tablet, Take 1 tablet by mouth every 12 (twelve) hours for 7 days, Disp: 14 tablet, Rfl: 0  •  chlorhexidine (PERIDEX) 0 12 % solution, Apply 15 mL to the mouth or throat 2 (two) times a day, Disp: 120 mL, Rfl: 0  •  ferrous sulfate 325 (65 Fe) mg tablet, Take 1 tab every other day with vit C, Disp: , Rfl:   •  ibuprofen (MOTRIN) 800 mg tablet, Take 1 tablet (800 mg total) by mouth 3 (three) times a day, Disp: 21 tablet, Rfl: 0  •  nicotine (NICODERM CQ) 21 mg/24 hr TD 24 hr patch, Place 1 patch on the skin every 24 hours, Disp: 28 patch, Rfl: 3  •  diphenhydrAMINE (BENADRYL) 25 mg tablet, Take 1-2 tablets (25-50 mg total) by mouth every 6 (six) hours (Patient not taking: Reported on 11/28/2022), Disp: 30 tablet, Rfl: 0    Current Allergies     Allergies as of 11/28/2022 - Reviewed 11/28/2022   Allergen Reaction Noted   • Clindamycin Throat Swelling 11/08/2022   • Vicodin [hydrocodone-acetaminophen] Vomiting 12/12/2021              The following portions of the patient's history were reviewed and updated as appropriate: allergies, current medications, past family history, past medical history, past social history, past surgical history and problem list      Past Medical History:   Diagnosis Date   • Bronchitis        Past Surgical History:   Procedure Laterality Date   • WISDOM TOOTH EXTRACTION         History reviewed  No pertinent family history  Medications have been verified  Objective     /58   Pulse (!) 112   Temp 98 8 °F (37 1 °C)   Resp 18   Ht 4' 9" (1 448 m)   Wt 42 6 kg (94 lb)   SpO2 98%   BMI 20 34 kg/m²   No LMP recorded  Physical Exam     Physical Exam  Vitals and nursing note reviewed  Constitutional:       General: She is awake  She is not in acute distress  Appearance: Normal appearance  She is not ill-appearing, toxic-appearing or diaphoretic  HENT:      Head:      Jaw: No trismus or swelling  Nose: Nose normal       Mouth/Throat:      Lips: Pink  Mouth: Mucous membranes are moist       Dentition: Abnormal dentition  Dental tenderness, gingival swelling and dental caries present  No dental abscesses  Pharynx: Oropharynx is clear  Uvula midline   No pharyngeal swelling, oropharyngeal exudate, posterior oropharyngeal erythema or uvula swelling  Cardiovascular:      Rate and Rhythm: Tachycardia present  Pulses: Normal pulses  Heart sounds: Normal heart sounds, S1 normal and S2 normal       Comments: Slight tachycardia  Pulmonary:      Effort: Pulmonary effort is normal       Breath sounds: Normal breath sounds and air entry  Skin:     General: Skin is warm  Capillary Refill: Capillary refill takes less than 2 seconds  Neurological:      Mental Status: She is alert  Psychiatric:         Mood and Affect: Mood normal          Behavior: Behavior normal          Thought Content:  Thought content normal          Judgment: Judgment normal

## 2022-11-28 NOTE — PATIENT INSTRUCTIONS
Take antibiotic as prescribed  Recommend probiotic use while taking antibiotic  Use peridex as prescribed  Recommend follow-up with your dentist    Acetaminophen or ibuprofen for for pain  Follow-up with PCP in 3-5 days  Go to ER if symptoms worsen

## 2022-12-08 ENCOUNTER — OFFICE VISIT (OUTPATIENT)
Dept: URGENT CARE | Facility: CLINIC | Age: 27
End: 2022-12-08

## 2022-12-08 VITALS
WEIGHT: 94 LBS | OXYGEN SATURATION: 98 % | TEMPERATURE: 98.7 F | HEIGHT: 57 IN | DIASTOLIC BLOOD PRESSURE: 55 MMHG | SYSTOLIC BLOOD PRESSURE: 99 MMHG | RESPIRATION RATE: 18 BRPM | HEART RATE: 96 BPM | BODY MASS INDEX: 20.28 KG/M2

## 2022-12-08 DIAGNOSIS — R10.12 LEFT UPPER QUADRANT ABDOMINAL PAIN: ICD-10-CM

## 2022-12-08 DIAGNOSIS — A59.9 TRICHOMONAS INFECTION: Primary | ICD-10-CM

## 2022-12-08 RX ORDER — METRONIDAZOLE 500 MG/1
500 TABLET ORAL EVERY 12 HOURS SCHEDULED
Qty: 14 TABLET | Refills: 0 | Status: SHIPPED | OUTPATIENT
Start: 2022-12-08 | End: 2022-12-15

## 2022-12-08 NOTE — PATIENT INSTRUCTIONS
Metronidazole as prescribed  Do not drink alcohol while taking medication  Always wear a condom when engaging in sexual activities  Inform all partners that you have tested positive for trichomonas  It is possible to have more than one STD at the same time  Please follow up with primary care doctor or another health center/testing facility for additional STD testing  Follow up with PCP in 3-5 days  Proceed to  ER for evaluation of abdominal pain

## 2022-12-08 NOTE — PROGRESS NOTES
[facility]        NAME: Lynne Garcia is a 32 y o  female  : 1995    MRN: [id number]  DATE: 2022  TIME: 3:19 PM    Assessment and Plan   Trichomonas infection [A59 9]  1  Trichomonas infection  metroNIDAZOLE (FLAGYL) 500 mg tablet      2  Left upper quadrant abdominal pain  Transfer to other facility          Patient's urine was positive for trichomonas yesterday when seen in the ED  Patient left before being evaluated  Denies vaginal discharge  Sending tx for trichomonas to the pharmacy  Informed her that I am unable to fully rule out other etiologies of abdominal pain in the [facility]  Recommend ED evaluation for abdominal pain  Patient Instructions     Metronidazole as prescribed  Do not drink alcohol while taking medication  Always wear a condom when engaging in sexual activities  Inform all partners that you have tested positive for trichomonas  It is possible to have more than one STD at the same time  Please follow up with primary care doctor or another health center/testing facility for additional STD testing  Follow up with PCP in 3-5 days  Proceed to  ER for evaluation of abdominal pain  Chief Complaint     Chief Complaint   Patient presents with   • Abdominal Pain     Patient c/o of sharp pain starting at bottom of rib cage radiating into lower left abdomen  This pain started yesterday  History of Present Illness       Denies prior similar pain  LMP 11/15/2022  LNBM: this morning  Hx/o 2 ovarian cysts  States they "shrunk with birth control", but states that pain felt different  Denies hx/o gastritis, PUD, crohn's disease, ulcerative colitis, diverticulitis, ovarian torsion  Denies alcohol or NSAID use  New injuries or new exercises  Denies intercourse over the past 2 weeks  States she has been in a monogamous relationship for years  Patient was seen in ED yesterday where a urine dip was performed  She left before being seen   Urine dip was positive for Leukocytes, blood, and trichomonas  Abdominal Pain  This is a new problem  The current episode started yesterday  The problem occurs intermittently  The problem has been unchanged  The pain is located in the LUQ  The pain is at a severity of 8/10  Quality: sharp  Pain radiation: LLQ  Associated symptoms include nausea  Pertinent negatives include no constipation, diarrhea, dysuria, fever, frequency, headaches, hematochezia, hematuria, melena, myalgias or vomiting  Exacerbated by: movement, leaning forward  The pain is relieved by being still (laying down)  She has tried nothing for the symptoms  There is no history of abdominal surgery  Review of Systems   Review of Systems   Constitutional: Negative for chills and fever  Respiratory: Negative for cough and shortness of breath  Cardiovascular: Negative for chest pain and palpitations  Gastrointestinal: Positive for abdominal pain and nausea  Negative for abdominal distention, anal bleeding, blood in stool, constipation, diarrhea, hematochezia, melena and vomiting  Genitourinary: Negative for dysuria, flank pain, frequency, hematuria, urgency, vaginal discharge and vaginal pain  Musculoskeletal: Negative for myalgias  Skin: Negative for rash  Neurological: Negative for dizziness, light-headedness and headaches           Current Medications       Current Outpatient Medications:   •  acetaminophen (TYLENOL) 500 mg tablet, Take 2 tablets (1,000 mg total) by mouth every 8 (eight) hours as needed for mild pain, Disp: 30 tablet, Rfl: 0  •  ferrous sulfate 325 (65 Fe) mg tablet, Take 1 tab every other day with vit C, Disp: , Rfl:   •  metroNIDAZOLE (FLAGYL) 500 mg tablet, Take 1 tablet (500 mg total) by mouth every 12 (twelve) hours for 7 days, Disp: 14 tablet, Rfl: 0  •  nicotine (NICODERM CQ) 21 mg/24 hr TD 24 hr patch, Place 1 patch on the skin every 24 hours, Disp: 28 patch, Rfl: 3  •  chlorhexidine (PERIDEX) 0 12 % solution, Apply 15 mL to the mouth or throat 2 (two) times a day (Patient not taking: Reported on 12/8/2022), Disp: 120 mL, Rfl: 0  •  diphenhydrAMINE (BENADRYL) 25 mg tablet, Take 1-2 tablets (25-50 mg total) by mouth every 6 (six) hours (Patient not taking: Reported on 11/28/2022), Disp: 30 tablet, Rfl: 0  •  ibuprofen (MOTRIN) 800 mg tablet, Take 1 tablet (800 mg total) by mouth 3 (three) times a day (Patient not taking: Reported on 12/8/2022), Disp: 21 tablet, Rfl: 0    Current Allergies     Allergies as of 12/08/2022 - Reviewed 12/08/2022   Allergen Reaction Noted   • Clindamycin Throat Swelling 11/08/2022   • Vicodin [hydrocodone-acetaminophen] Vomiting 12/12/2021            The following portions of the patient's history were reviewed and updated as appropriate: allergies, current medications, past family history, past medical history, past social history, past surgical history and problem list      Past Medical History:   Diagnosis Date   • Bronchitis        Past Surgical History:   Procedure Laterality Date   • WISDOM TOOTH EXTRACTION         History reviewed  No pertinent family history  Medications have been verified  Objective   BP 99/55   Pulse 96   Temp 98 7 °F (37 1 °C) (Temporal)   Resp 18   Ht 4' 9" (1 448 m)   Wt 42 6 kg (94 lb)   LMP 11/15/2022 (Exact Date)   SpO2 98%   BMI 20 34 kg/m²   Patient's last menstrual period was 11/15/2022 (exact date)  Physical Exam     Physical Exam  Constitutional:       General: She is not in acute distress  Appearance: She is well-developed  She is not diaphoretic  Cardiovascular:      Rate and Rhythm: Normal rate and regular rhythm  Heart sounds: No murmur heard  No friction rub  No gallop  Pulmonary:      Effort: Pulmonary effort is normal  No respiratory distress  Breath sounds: Normal breath sounds  No wheezing, rhonchi or rales  Abdominal:      General: Bowel sounds are normal  There is no distension  Palpations: Abdomen is soft   There is no mass  Tenderness: There is abdominal tenderness in the suprapubic area, left upper quadrant and left lower quadrant  There is no guarding or rebound  Comments:      Lymphadenopathy:      Cervical: No cervical adenopathy  Skin:     General: Skin is warm  Neurological:      Mental Status: She is alert  Psychiatric:         Behavior: Behavior normal          Thought Content:  Thought content normal          Judgment: Judgment normal

## 2023-04-07 ENCOUNTER — TELEPHONE (OUTPATIENT)
Dept: PERINATAL CARE | Facility: OTHER | Age: 28
End: 2023-04-07

## 2023-04-07 NOTE — TELEPHONE ENCOUNTER
Called patient to schedule MFM appointment, based on referral issued to Maternal Fetal Medicine by [facility] office (and staff message):    [facility], [address] [facility]  OK to scheduled VV or face to face with MFM  Left voicemail requesting patient to call back and schedule appointment, with office number for return call [phone]

## 2023-06-07 DIAGNOSIS — U07.1 COVID-19: ICD-10-CM

## 2023-06-08 RX ORDER — IBUPROFEN 800 MG/1
800 TABLET ORAL 3 TIMES DAILY
Qty: 21 TABLET | Refills: 0 | Status: SHIPPED | OUTPATIENT
Start: 2023-06-08

## 2023-09-05 ENCOUNTER — HOSPITAL ENCOUNTER (EMERGENCY)
Facility: HOSPITAL | Age: 28
Discharge: HOME | End: 2023-09-05
Attending: EMERGENCY MEDICINE
Payer: COMMERCIAL

## 2023-09-05 ENCOUNTER — APPOINTMENT (EMERGENCY)
Dept: ULTRASOUND IMAGING | Facility: HOSPITAL | Age: 28
End: 2023-09-05
Payer: COMMERCIAL

## 2023-09-05 ENCOUNTER — TELEPHONE (OUTPATIENT)
Dept: OBGYN CLINIC | Facility: MEDICAL CENTER | Age: 28
End: 2023-09-05

## 2023-09-05 VITALS
HEART RATE: 74 BPM | RESPIRATION RATE: 18 BRPM | DIASTOLIC BLOOD PRESSURE: 64 MMHG | OXYGEN SATURATION: 99 % | HEIGHT: 57 IN | WEIGHT: 95 LBS | TEMPERATURE: 98.7 F | BODY MASS INDEX: 20.49 KG/M2 | SYSTOLIC BLOOD PRESSURE: 101 MMHG

## 2023-09-05 DIAGNOSIS — O26.899 ABDOMINAL PAIN IN PREGNANCY: Primary | ICD-10-CM

## 2023-09-05 DIAGNOSIS — R82.71 BACTERIURIA IN PREGNANCY: ICD-10-CM

## 2023-09-05 DIAGNOSIS — R10.9 ABDOMINAL PAIN IN PREGNANCY: Primary | ICD-10-CM

## 2023-09-05 DIAGNOSIS — O99.891 BACTERIURIA IN PREGNANCY: ICD-10-CM

## 2023-09-05 LAB
ABO GROUP BLD: NORMAL
ALBUMIN SERPL BCP-MCNC: 4.2 G/DL (ref 3.5–5)
ALP SERPL-CCNC: 36 U/L (ref 34–104)
ALT SERPL W P-5'-P-CCNC: 8 U/L (ref 7–52)
AMORPH PHOS CRY URNS QL MICRO: ABNORMAL /HPF
ANION GAP SERPL CALCULATED.3IONS-SCNC: 8 MMOL/L
APTT PPP: 27 SECONDS (ref 23–37)
AST SERPL W P-5'-P-CCNC: 10 U/L (ref 13–39)
B-HCG SERPL-ACNC: ABNORMAL MIU/ML (ref 0–5)
BACTERIA UR QL AUTO: ABNORMAL /HPF
BASOPHILS # BLD AUTO: 0.03 THOUSANDS/ÂΜL (ref 0–0.1)
BASOPHILS NFR BLD AUTO: 0 % (ref 0–1)
BILIRUB SERPL-MCNC: 0.33 MG/DL (ref 0.2–1)
BILIRUB UR QL STRIP: NEGATIVE
BLD GP AB SCN SERPL QL: NEGATIVE
BUN SERPL-MCNC: 10 MG/DL (ref 5–25)
CALCIUM SERPL-MCNC: 9.7 MG/DL (ref 8.4–10.2)
CHLORIDE SERPL-SCNC: 103 MMOL/L (ref 96–108)
CLARITY UR: ABNORMAL
CO2 SERPL-SCNC: 24 MMOL/L (ref 21–32)
COLOR UR: YELLOW
CREAT SERPL-MCNC: 0.69 MG/DL (ref 0.6–1.3)
EOSINOPHIL # BLD AUTO: 0.35 THOUSAND/ÂΜL (ref 0–0.61)
EOSINOPHIL NFR BLD AUTO: 4 % (ref 0–6)
ERYTHROCYTE [DISTWIDTH] IN BLOOD BY AUTOMATED COUNT: 13.2 % (ref 11.6–15.1)
GFR SERPL CREATININE-BSD FRML MDRD: 117 ML/MIN/1.73SQ M
GLUCOSE SERPL-MCNC: 88 MG/DL (ref 65–140)
GLUCOSE UR STRIP-MCNC: NEGATIVE MG/DL
HCT VFR BLD AUTO: 33.7 % (ref 34.8–46.1)
HGB BLD-MCNC: 11.3 G/DL (ref 11.5–15.4)
HGB UR QL STRIP.AUTO: NEGATIVE
IMM GRANULOCYTES # BLD AUTO: 0.04 THOUSAND/UL (ref 0–0.2)
IMM GRANULOCYTES NFR BLD AUTO: 0 % (ref 0–2)
INR PPP: 1.03 (ref 0.84–1.19)
KETONES UR STRIP-MCNC: NEGATIVE MG/DL
LEUKOCYTE ESTERASE UR QL STRIP: ABNORMAL
LYMPHOCYTES # BLD AUTO: 1.9 THOUSANDS/ÂΜL (ref 0.6–4.47)
LYMPHOCYTES NFR BLD AUTO: 20 % (ref 14–44)
MCH RBC QN AUTO: 33.7 PG (ref 26.8–34.3)
MCHC RBC AUTO-ENTMCNC: 33.5 G/DL (ref 31.4–37.4)
MCV RBC AUTO: 101 FL (ref 82–98)
MONOCYTES # BLD AUTO: 0.7 THOUSAND/ÂΜL (ref 0.17–1.22)
MONOCYTES NFR BLD AUTO: 7 % (ref 4–12)
MUCOUS THREADS UR QL AUTO: ABNORMAL
NEUTROPHILS # BLD AUTO: 6.54 THOUSANDS/ÂΜL (ref 1.85–7.62)
NEUTS SEG NFR BLD AUTO: 69 % (ref 43–75)
NITRITE UR QL STRIP: NEGATIVE
NON-SQ EPI CELLS URNS QL MICRO: ABNORMAL /HPF
NRBC BLD AUTO-RTO: 0 /100 WBCS
PH UR STRIP.AUTO: 7.5 [PH]
PLATELET # BLD AUTO: 273 THOUSANDS/UL (ref 149–390)
PMV BLD AUTO: 10.8 FL (ref 8.9–12.7)
POTASSIUM SERPL-SCNC: 4.1 MMOL/L (ref 3.5–5.3)
PROT SERPL-MCNC: 6.4 G/DL (ref 6.4–8.4)
PROT UR STRIP-MCNC: NEGATIVE MG/DL
PROTHROMBIN TIME: 13.6 SECONDS (ref 11.6–14.5)
RBC # BLD AUTO: 3.35 MILLION/UL (ref 3.81–5.12)
RBC #/AREA URNS AUTO: ABNORMAL /HPF
RH BLD: POSITIVE
SODIUM SERPL-SCNC: 135 MMOL/L (ref 135–147)
SP GR UR STRIP.AUTO: 1.02
SPECIMEN EXPIRATION DATE: NORMAL
UROBILINOGEN UR QL STRIP.AUTO: 0.2 E.U./DL
WBC # BLD AUTO: 9.56 THOUSAND/UL (ref 4.31–10.16)
WBC #/AREA URNS AUTO: ABNORMAL /HPF

## 2023-09-05 PROCEDURE — 81003 URINALYSIS AUTO W/O SCOPE: CPT | Performed by: EMERGENCY MEDICINE

## 2023-09-05 PROCEDURE — 36415 COLL VENOUS BLD VENIPUNCTURE: CPT | Performed by: EMERGENCY MEDICINE

## 2023-09-05 PROCEDURE — 86850 RBC ANTIBODY SCREEN: CPT | Performed by: EMERGENCY MEDICINE

## 2023-09-05 PROCEDURE — 99284 EMERGENCY DEPT VISIT MOD MDM: CPT

## 2023-09-05 PROCEDURE — 76801 OB US < 14 WKS SINGLE FETUS: CPT

## 2023-09-05 PROCEDURE — 80053 COMPREHEN METABOLIC PANEL: CPT | Performed by: EMERGENCY MEDICINE

## 2023-09-05 PROCEDURE — 85025 COMPLETE CBC W/AUTO DIFF WBC: CPT | Performed by: EMERGENCY MEDICINE

## 2023-09-05 PROCEDURE — 99284 EMERGENCY DEPT VISIT MOD MDM: CPT | Performed by: EMERGENCY MEDICINE

## 2023-09-05 PROCEDURE — 81001 URINALYSIS AUTO W/SCOPE: CPT | Performed by: EMERGENCY MEDICINE

## 2023-09-05 PROCEDURE — 86901 BLOOD TYPING SEROLOGIC RH(D): CPT | Performed by: EMERGENCY MEDICINE

## 2023-09-05 PROCEDURE — 86900 BLOOD TYPING SEROLOGIC ABO: CPT | Performed by: EMERGENCY MEDICINE

## 2023-09-05 PROCEDURE — 84702 CHORIONIC GONADOTROPIN TEST: CPT | Performed by: EMERGENCY MEDICINE

## 2023-09-05 PROCEDURE — 85610 PROTHROMBIN TIME: CPT | Performed by: EMERGENCY MEDICINE

## 2023-09-05 PROCEDURE — 85730 THROMBOPLASTIN TIME PARTIAL: CPT | Performed by: EMERGENCY MEDICINE

## 2023-09-05 RX ORDER — CEPHALEXIN 500 MG/1
500 CAPSULE ORAL ONCE
Status: COMPLETED | OUTPATIENT
Start: 2023-09-05 | End: 2023-09-05

## 2023-09-05 RX ORDER — CEPHALEXIN 500 MG/1
500 CAPSULE ORAL EVERY 8 HOURS SCHEDULED
Qty: 21 CAPSULE | Refills: 0 | Status: SHIPPED | OUTPATIENT
Start: 2023-09-05 | End: 2023-09-12

## 2023-09-05 RX ADMIN — CEPHALEXIN 500 MG: 500 CAPSULE ORAL at 17:08

## 2023-09-05 NOTE — TELEPHONE ENCOUNTER
Pt called office today would like to begin care with our office she is currently pregnant 10 weeks haven't had a US yet just got her blood work done with [facility]. Provided pt our fax number to sent her bw.

## 2023-09-05 NOTE — ED PROVIDER NOTES
History  Chief Complaint   Patient presents with   • Abdominal Pain     Patient is a  41-year-old G2, P0 female 10 weeks 2 days gestation, who presents for evaluation of lower abdominal and low back pain. Patient says she was out with her dad when she started to develop some discomfort in the lower abdomen and lower back. She says she "got nervous" so came in for evaluation. She says that her last pregnancy ended in miscarriage at 4 weeks. She has yet to see an OB/GYN but is scheduled to have an ultrasound done on September 12. She admits to one single episode of "spotting" earlier today. She denies any vaginal discharge, urinary symptoms. She says that the pain has improved but is still somewhat present. Denies any urinary symptoms. Prior to Admission Medications   Prescriptions Last Dose Informant Patient Reported? Taking?   acetaminophen (TYLENOL) 500 mg tablet   No No   Sig: Take 2 tablets (1,000 mg total) by mouth every 8 (eight) hours as needed for mild pain   chlorhexidine (PERIDEX) 0.12 % solution   No No   Sig: Apply 15 mL to the mouth or throat 2 (two) times a day   diphenhydrAMINE (BENADRYL) 25 mg tablet   No No   Sig: Take 1-2 tablets (25-50 mg total) by mouth every 6 (six) hours   Patient not taking: Reported on 11/28/2022   ferrous sulfate 325 (65 Fe) mg tablet   Yes No   Sig: Take 1 tab every other day with vit C      Facility-Administered Medications: None       Past Medical History:   Diagnosis Date   • Bronchitis        Past Surgical History:   Procedure Laterality Date   • WISDOM TOOTH EXTRACTION         History reviewed. No pertinent family history. I have reviewed and agree with the history as documented.     E-Cigarette/Vaping   • E-Cigarette Use Never User      E-Cigarette/Vaping Substances     Social History     Tobacco Use   • Smoking status: Former     Packs/day: 1.00     Types: Cigarettes   • Smokeless tobacco: Never   Vaping Use   • Vaping Use: Never used   Substance Use Topics   • Alcohol use: Not Currently   • Drug use: Not Currently       Review of Systems   Constitutional: Negative for fever and unexpected weight change. HENT: Negative for congestion, ear pain, sore throat and trouble swallowing. Eyes: Negative for pain and redness. Respiratory: Negative for cough, chest tightness and shortness of breath. Cardiovascular: Negative for chest pain and leg swelling. Gastrointestinal: Positive for abdominal pain (lower,suprapubic). Negative for abdominal distention, diarrhea and vomiting. Endocrine: Negative for polyuria. Genitourinary: Positive for vaginal bleeding ("spotting"). Negative for dysuria, hematuria and pelvic pain. Musculoskeletal: Negative for back pain and myalgias. Skin: Negative for rash. Neurological: Negative for dizziness, syncope, weakness, light-headedness and headaches. Physical Exam  Physical Exam  Vitals and nursing note reviewed. Constitutional:       General: She is not in acute distress. Appearance: She is well-developed. HENT:      Head: Normocephalic and atraumatic. Right Ear: External ear normal.      Left Ear: External ear normal.      Nose: Nose normal.      Mouth/Throat:      Mouth: Mucous membranes are moist.      Pharynx: No oropharyngeal exudate. Eyes:      Conjunctiva/sclera: Conjunctivae normal.      Pupils: Pupils are equal, round, and reactive to light. Cardiovascular:      Rate and Rhythm: Normal rate and regular rhythm. Heart sounds: Normal heart sounds. No murmur heard. No friction rub. No gallop. Pulmonary:      Effort: Pulmonary effort is normal. No respiratory distress. Breath sounds: Normal breath sounds. No wheezing or rales. Abdominal:      General: There is no distension. Palpations: Abdomen is soft. Tenderness: There is abdominal tenderness (mild, diffuse suprapubic) in the suprapubic area.  There is no right CVA tenderness, left CVA tenderness, guarding or rebound. Negative signs include Stevenson's sign and McBurney's sign. Musculoskeletal:         General: No swelling, tenderness or deformity. Normal range of motion. Cervical back: Normal range of motion and neck supple. Lymphadenopathy:      Cervical: No cervical adenopathy. Skin:     General: Skin is warm and dry. Neurological:      General: No focal deficit present. Mental Status: She is alert and oriented to person, place, and time. Mental status is at baseline. Cranial Nerves: No cranial nerve deficit. Sensory: No sensory deficit. Motor: No weakness or abnormal muscle tone. Coordination: Coordination normal.         Vital Signs  ED Triage Vitals [09/05/23 1337]   Temperature Pulse Respirations Blood Pressure SpO2   98.7 °F (37.1 °C) 86 18 98/58 97 %      Temp Source Heart Rate Source Patient Position - Orthostatic VS BP Location FiO2 (%)   Tympanic Monitor Sitting Left arm --      Pain Score       5           Vitals:    09/05/23 1337 09/05/23 1530 09/05/23 1716   BP: 98/58 (!) 85/57 101/64   Pulse: 86 74    Patient Position - Orthostatic VS: Sitting Sitting          Visual Acuity      ED Medications  Medications   cephalexin (KEFLEX) capsule 500 mg (500 mg Oral Given 9/5/23 1708)       Diagnostic Studies  Results Reviewed     Procedure Component Value Units Date/Time    hCG, quantitative [[id number]]  (Abnormal) Collected: 09/05/23 1415    Lab Status: Final result Specimen: Blood from Arm, Left Updated: 09/05/23 1609     HCG, Quant 62,324.5 mIU/mL     Narrative:       Expected Ranges:    HCG results between 5 and 25 mIU/mL may be indicative of early pregnancy but should be interpreted in light of the total clinical presentation. HCG can rise to detectable levels in elvira and post menopausal women (0-11.6 mIU/mL).      Approximate               Approximate HCG  Gestation age          Concentration ( mIU/mL)  _____________          ______________________   Onita Goodman HCG values  0.2-1                       5-50  1-2                           2-3                         100-5000  3-4                         500-63138  4-5                         1000-51963  5-6                         [id number]  6-8                         [id number]  8-12                        [id number]      Comprehensive metabolic panel [[id number]]  (Abnormal) Collected: 09/05/23 1415    Lab Status: Final result Specimen: Blood from Arm, Left Updated: 09/05/23 1539     Sodium 135 mmol/L      Potassium 4.1 mmol/L      Chloride 103 mmol/L      CO2 24 mmol/L      ANION GAP 8 mmol/L      BUN 10 mg/dL      Creatinine 0.69 mg/dL      Glucose 88 mg/dL      Calcium 9.7 mg/dL      AST 10 U/L      ALT 8 U/L      Alkaline Phosphatase 36 U/L      Total Protein 6.4 g/dL      Albumin 4.2 g/dL      Total Bilirubin 0.33 mg/dL      eGFR 117 ml/min/1.73sq m     Narrative:      [facility] guidelines for Chronic Kidney Disease (CKD):   •  Stage 1 with normal or high GFR (GFR > 90 mL/min/1.73 square meters)  •  Stage 2 Mild CKD (GFR = 60-89 mL/min/1.73 square meters)  •  Stage 3A Moderate CKD (GFR = 45-59 mL/min/1.73 square meters)  •  Stage 3B Moderate CKD (GFR = 30-44 mL/min/1.73 square meters)  •  Stage 4 Severe CKD (GFR = 15-29 mL/min/1.73 square meters)  •  Stage 5 End Stage CKD (GFR <15 mL/min/1.73 square meters)  Note: GFR calculation is accurate only with a steady state creatinine    Protime-INR [[id number]]  (Normal) Collected: 09/05/23 1415    Lab Status: Final result Specimen: Blood from Arm, Left Updated: 09/05/23 1526     Protime 13.6 seconds      INR 1.03    APTT [[id number]]  (Normal) Collected: 09/05/23 1415    Lab Status: Final result Specimen: Blood from Arm, Left Updated: 09/05/23 1526     PTT 27 seconds     CBC and differential [[id number]]  (Abnormal) Collected: 09/05/23 1415    Lab Status: Final result Specimen: Blood from Arm, Left Updated: 09/05/23 1514     WBC 9.56 Thousand/uL      RBC 3.35 Million/uL      Hemoglobin 11.3 g/dL      Hematocrit 33.7 %       fL      MCH 33.7 pg      MCHC 33.5 g/dL      RDW 13.2 %      MPV 10.8 fL      Platelets 473 Thousands/uL      nRBC 0 /100 WBCs      Neutrophils Relative 69 %      Immat GRANS % 0 %      Lymphocytes Relative 20 %      Monocytes Relative 7 %      Eosinophils Relative 4 %      Basophils Relative 0 %      Neutrophils Absolute 6.54 Thousands/µL      Immature Grans Absolute 0.04 Thousand/uL      Lymphocytes Absolute 1.90 Thousands/µL      Monocytes Absolute 0.70 Thousand/µL      Eosinophils Absolute 0.35 Thousand/µL      Basophils Absolute 0.03 Thousands/µL     Urine Microscopic [[id number]]  (Abnormal) Collected: 09/05/23 1437    Lab Status: Final result Specimen: Urine, Clean Catch Updated: 09/05/23 1506     RBC, UA None Seen /hpf      WBC, UA 10-20 /hpf      Epithelial Cells Occasional /hpf      Bacteria, UA Occasional /hpf      AMORPH PHOSPATES Moderate /hpf      MUCUS THREADS Occasional    UA (URINE) with reflex to Scope [[id number]]  (Abnormal) Collected: 09/05/23 1437    Lab Status: Final result Specimen: Urine, Clean Catch Updated: 09/05/23 1452     Color, UA Yellow     Clarity, UA Cloudy     Specific Gravity, UA 1.020     pH, UA 7.5     Leukocytes, UA 1+     Nitrite, UA Negative     Protein, UA Negative mg/dl      Glucose, UA Negative mg/dl      Ketones, UA Negative mg/dl      Urobilinogen, UA 0.2 E.U./dl      Bilirubin, UA Negative     Occult Blood, UA Negative                 US OB < 14 weeks with transvaginal   Final Result by Parris Shrestha MD (09/05 1521)      Single live intrauterine gestation at 10 weeks 6 days. AMMY of 03/27/2024. Workstation performed: [id number]                    Procedures  Procedures         ED Course                               SBIRT 22yo+    Flowsheet Row Most Recent Value   Initial Alcohol Screen: US AUDIT-C     1.  How often do you have a drink containing alcohol? 0 Filed at: 2023 1342   2. How many drinks containing alcohol do you have on a typical day you are drinking? 0 Filed at: 2023 1342   3a. Male UNDER 65: How often do you have five or more drinks on one occasion? 0 Filed at: 2023 1342   3b. FEMALE Any Age, or MALE 65+: How often do you have 4 or more drinks on one occassion? 0 Filed at: 2023 134   Audit-C Score 0 Filed at: 2023 1342   GA: How many times in the past year have you. .. Used an illegal drug or used a prescription medication for non-medical reasons? Never Filed at: 2023 134                    Medical Decision Making  59-year-old  female at 10 weeks 2 days gestation presenting for evaluation of some suprapubic abdominal discomfort/low back pain. 1 tiny episode of vaginal spotting. Has yet to see an OB/GYN. Pain has improved. Mild suprapubic tenderness on exam.  Differential includes round ligament pain, UTI, miscarriage, ectopic pregnancy  CBC, CMP, hCG quantitative, type and screen. Will obtain UA. Will obtain pelvic ultrasound. US shows IUP at 10w 6d w/ fetal HR of 154. Type and screen O+, no need for rhogam.  Rest of labs WNL. UA shows bacteria, will treat given first trimester pregnancy  Patietn told to follow up with her OBGYN    Abdominal pain in pregnancy: acute illness or injury  Bacteriuria in pregnancy: acute illness or injury  Amount and/or Complexity of Data Reviewed  Labs: ordered. Radiology: ordered. Risk  Prescription drug management.           Disposition  Final diagnoses:   Abdominal pain in pregnancy   Bacteriuria in pregnancy     Time reflects when diagnosis was documented in both MDM as applicable and the Disposition within this note     Time User Action Codes Description Comment    2023  4:57 PM Tete Houston [O26.899,  R10.9] Abdominal pain in pregnancy     2023  5:00 PM Tete Houston [Q78.722,  R82.71] Bacteriuria in pregnancy       ED Disposition     ED Disposition Discharge    Condition   Stable    Date/Time   Tue Sep 5, 2023  4:57 PM    Comment   Nena Leone discharge to home/self care. Follow-up Information     Follow up With Specialties Details Why Contact Info Additional Information    your OBGYN  Schedule an appointment as soon as possible for a visit  For follow up of symptoms      [address] [facility] Emergency Department Emergency Medicine Go to  If symptoms worsen [address] [place] [id number] [phone] [address] [facility] Department, [address] [facility], [address] [facility]          Discharge Medication List as of 9/5/2023  5:06 PM      START taking these medications    Details   cephalexin (KEFLEX) 500 mg capsule Take 1 capsule (500 mg total) by mouth every 8 (eight) hours for 7 days, Starting Tue 9/5/2023, Until Tue 9/12/2023, Normal         CONTINUE these medications which have NOT CHANGED    Details   acetaminophen (TYLENOL) 500 mg tablet Take 2 tablets (1,000 mg total) by mouth every 8 (eight) hours as needed for mild pain, Starting Sat 12/4/2021, Print      chlorhexidine (PERIDEX) 0.12 % solution Apply 15 mL to the mouth or throat 2 (two) times a day, Starting Mon 8/7/2023, Normal      diphenhydrAMINE (BENADRYL) 25 mg tablet Take 1-2 tablets (25-50 mg total) by mouth every 6 (six) hours, Starting Thu 2/10/2022, Normal      ferrous sulfate 325 (65 Fe) mg tablet Take 1 tab every other day with vit C, Historical Med             No discharge procedures on file.     PDMP Review     None          ED Provider  Electronically Signed by           Kassandra Mcarthur DO  09/06/23 6861

## 2023-10-30 ENCOUNTER — PATIENT MESSAGE (OUTPATIENT)
Dept: FAMILY MEDICINE CLINIC | Facility: CLINIC | Age: 28
End: 2023-10-30

## 2023-10-30 DIAGNOSIS — D22.9 NEVUS: Primary | ICD-10-CM

## 2023-11-17 RX ORDER — PROGESTERONE 200 MG/1
CAPSULE ORAL
COMMUNITY
Start: 2023-08-03

## 2023-11-17 NOTE — PROGRESS NOTES
Assessment/Plan:    Benign neoplasm of skin of neck  Patient is a pleasant 22-week pregnant 25-year-old female presenting with a pedunculated skin lesion with a narrow stalk on the nape of her neck that her hair continues to get caught around resulting in a recent visit to urgent care center for which she now desires definitive treatment. On physical examination the patient has a 1 cm pedunculated skin polyp with a very narrow stalk amenable to shave biopsy here in the office under local anesthesia. The technical details of the procedure reviewed and informed verbal consent obtained to proceed. Diagnoses and all orders for this visit:    Benign neoplasm of skin of neck    Nevus  -     Ambulatory Referral to General Surgery    Other orders  -     ALBUTEROL SULFATE HFA IN  -     Progesterone 200 MG CAPS;  (Patient not taking: Reported on 11/20/2023)          Subjective:      Patient ID: Indira Campos is a 25 y.o. female. Patient is here for a mole on the back of her neck ( Pt is 22weeks pregnant) that she has been dealing with for 10 years and it hurts when her hair gets caught on it. Patient states the mole got bigger ever since she got pregnant. Pt denies drainage or fevers/chills. Svetlana BROOKS MA            Review of Systems   Constitutional:  Negative for chills and fever. HENT:  Negative for ear pain and sore throat. Eyes:  Negative for pain and visual disturbance. Respiratory:  Negative for cough and shortness of breath. Cardiovascular:  Negative for chest pain and palpitations. Gastrointestinal:  Negative for abdominal pain and vomiting. Genitourinary:  Negative for dysuria and hematuria. Musculoskeletal:  Negative for arthralgias and back pain. Skin:  Negative for color change and rash. Neurological:  Negative for seizures and syncope. All other systems reviewed and are negative.         Objective:      /60 (BP Location: Left arm, Patient Position: Sitting, Cuff Size: Standard)   Pulse 97   Temp 97.5 °F (36.4 °C) (Temporal)   Resp 18   Ht 4' 9" (1.448 m)   Wt 47.6 kg (105 lb)   LMP 06/25/2023   SpO2 99%   BMI 22.72 kg/m²          Physical Exam  Constitutional:       Appearance: She is well-developed. HENT:      Head: Normocephalic and atraumatic. Eyes:      Conjunctiva/sclera: Conjunctivae normal.      Pupils: Pupils are equal, round, and reactive to light. Cardiovascular:      Rate and Rhythm: Normal rate and regular rhythm. Pulmonary:      Effort: Pulmonary effort is normal.      Breath sounds: Normal breath sounds. Abdominal:      General: Bowel sounds are normal.      Palpations: Abdomen is soft. Musculoskeletal:         General: Normal range of motion. Cervical back: Normal range of motion and neck supple. Skin:     General: Skin is warm and dry. Comments: 1 cm pedunculated skin polyp on the nape of the neck with a narrow stalk. Neurological:      Mental Status: She is alert and oriented to person, place, and time. Psychiatric:         Behavior: Behavior normal.         Thought Content: Thought content normal.         Judgment: Judgment normal.         Shave lesion    Date/Time: 11/20/2023 2:00 PM    Performed by: Braydon Ko MD  Authorized by: Braydon Ko MD  Universal Protocol:  Consent: Verbal consent obtained. Risks and benefits: risks, benefits and alternatives were discussed  Consent given by: patient  Time out: Immediately prior to procedure a "time out" was called to verify the correct patient, procedure, equipment, support staff and site/side marked as required.   Timeout called at: 11/20/2023 2:15 PM.  Patient understanding: patient states understanding of the procedure being performed  Patient consent: the patient's understanding of the procedure matches consent given  Site marked: the operative site was marked  Patient identity confirmed: verbally with patient    Number of Lesions: 1  Lesion 1:     Body area: head/neck    Head/neck location: neck (Posterior midline neck)    Malignancy: benign lesion      Destruction method: shave removal

## 2023-11-20 ENCOUNTER — CONSULT (OUTPATIENT)
Dept: SURGERY | Facility: CLINIC | Age: 28
End: 2023-11-20
Payer: COMMERCIAL

## 2023-11-20 VITALS
HEART RATE: 97 BPM | HEIGHT: 57 IN | BODY MASS INDEX: 22.65 KG/M2 | WEIGHT: 105 LBS | OXYGEN SATURATION: 99 % | RESPIRATION RATE: 18 BRPM | TEMPERATURE: 97.5 F | DIASTOLIC BLOOD PRESSURE: 60 MMHG | SYSTOLIC BLOOD PRESSURE: 100 MMHG

## 2023-11-20 DIAGNOSIS — D22.9 NEVUS: ICD-10-CM

## 2023-11-20 DIAGNOSIS — D23.4 BENIGN NEOPLASM OF SKIN OF NECK: Primary | ICD-10-CM

## 2023-11-20 PROCEDURE — 99203 OFFICE O/P NEW LOW 30 MIN: CPT | Performed by: SURGERY

## 2023-11-20 PROCEDURE — 11305 SHAVE SKIN LESION 0.5 CM/<: CPT | Performed by: SURGERY

## 2023-11-20 NOTE — ASSESSMENT & PLAN NOTE
Patient is a pleasant 22-week pregnant 17-year-old female presenting with a pedunculated skin lesion with a narrow stalk on the nape of her neck that her hair continues to get caught around resulting in a recent visit to urgent care center for which she now desires definitive treatment. On physical examination the patient has a 1 cm pedunculated skin polyp with a very narrow stalk amenable to shave biopsy here in the office under local anesthesia. The technical details of the procedure reviewed and informed verbal consent obtained to proceed.

## 2023-12-13 ENCOUNTER — TELEPHONE (OUTPATIENT)
Dept: SURGERY | Facility: CLINIC | Age: 28
End: 2023-12-13